# Patient Record
(demographics unavailable — no encounter records)

---

## 2025-01-30 NOTE — PHYSICAL EXAM
[Normal Venous Pressure] : normal venous pressure [Normal S1, S2] : normal S1, S2 [Murmur] : murmur [Clear Lung Fields] : clear lung fields [Soft] : abdomen soft [de-identified] : GUNNAR

## 2025-01-30 NOTE — DISCUSSION/SUMMARY
[FreeTextEntry1] : pt with LAE and some intermittent sob  continue diabetes diet  pt told ibuprofen daily should not be done due to increased risk of MI  pt if severe OA told to get surgery if severe pain or to use Tylenol - AVOID NSAIDs  compensated DD2  PVC BURDEN 17% 2024, continue atenolol to 75 mg daily unable to tolerate metoprolol in past  f/u pmd for Synthroid  use compression stockings for venous insuffiency and f/u varicosities with dr. diamond  diet control for DM cont farxiga 10 mg po qd  continue ranolazine 500 mg po q12  continue aspirin 81 mg po qd  stop Plavix 75 mg daily  stop atorvastatin 80 mg daily reduce 40 mg as statin myopathy  cont metoprolol 25 mg po q12  give sl ntg prn  has carotid dz but not severe will f/u  start Zetia 10 mg po qhs  bloodwork/f/u in 4 months/2d echo  if ldl not to goal will get Repatha next visit. REPATHA NOT COVERED WILL TRY PRALUENT  start zepbound - as per YANIV Rich patient's insurance does not cover any weight loss medications.

## 2025-01-30 NOTE — PHYSICAL EXAM
[Normal Venous Pressure] : normal venous pressure [Normal S1, S2] : normal S1, S2 [Murmur] : murmur [Clear Lung Fields] : clear lung fields [Soft] : abdomen soft [de-identified] : GUNNAR

## 2025-01-30 NOTE — PHYSICAL EXAM
[Normal Venous Pressure] : normal venous pressure [Normal S1, S2] : normal S1, S2 [Murmur] : murmur [Clear Lung Fields] : clear lung fields [Soft] : abdomen soft [de-identified] : GUNNAR

## 2025-01-30 NOTE — HISTORY OF PRESENT ILLNESS
[FreeTextEntry1] : PT with MIBG: LIMA to LAD 24, 24: CATH: 80% mLAD bifurcation complicated lesion calcified, 50% D1, 40% RCA, distal LAD 70%, CTA: : CAC: 1498 pLAD: 65%, CT FFR 0.92 OM1: CT FFR: OM1 pRCA: moderate stenosis RPDA: 0.70 PREDM, DD2 HLD, LVH, KIYA, LICA 50-69%, HTN, MORBID OBESITY, PVCS, VARICOSE VEINS  , SEVERE OA ,  STATIN MYOPATHY.   F/U dr. diamond vascular for varicosities, pt complaining of neuropathy, pt complains of pain all the time but not sure if vascular.  LDL 77 on atorvastatin plus ezetimbe had pain so taking half of ezemtimbe will stop was 103   3/24/23: 23: EF: 57%, E/e': 14, TR kirk: 2/7m/s CO: 4.3 l/min, LAE, mild-mod TR, LVH, mild thickening of aortic valve leaflets  t from 133 A1c: 6.0  pt sob going upstairs and ok flat ground. pt not very active. pt says severe OA is a problem for the patient.   23:  GFR: 101, HDL 56 LDL96 bnp: 72 a1c: not done  BS: 106  pt complains of more frequent palpitations and had palpitations today, pt hd PVCS on ekg and one day took atenolol for a lot of palpitation but otherwise is normal .  pt does not want VEIN STRIPPING, pt having more frequent edema in her legs in the sun.   10/5/23: pt under a lot of stress thinking about /father that  years ago and her friends   23: Telephone call with NP: Patient c/o palpitations during the day. STOP Atenolol START Metoprolol Tartrate 50mg BID.  pt comes in for evaluation of palpitations.  pt was not sleeping on metoprolol and started on atenolol. pt  getting sob at this time and says usually strong independent person. pt not sure if do to thyroid.  pt has bladder issues, LDL normal TSH 2.17. pt urinating a lot at night.   24: 24: EF: 50-55%,? midseptal mild hypokinesis, e' sept: 0.09 m/s E/e': 9.30, LVOT VTI: 26 cm, DD1, mild ABHINAV, mild LAE mild TR, YANELI, TDS  lv filling pressures improved but  ? midseptal hypokinesis, LVH improved  MCOT: PVC BURDEN OF 17% 3/24:  HDL 55, gfr: 99, A1c 6.3, tsh: 4.65  Patient states she is noticing that she has to breath deeper when going up the stairs or with vacuuming at times. pt does not walk far. pt when sob relaxes and resolves in a minute.   24: CTA: : CAC: 1498 pLAD: 65%, CT FFR 0.92 OM1: CT FFR: OM1 pRCA: moderate stenosis RPDA: 0.70  pt sob at times worsening and RWMA walking up for flight of stairs.  r/bs will schedule cath Dr. Yi start ranolazine 500 mg po qd and baby aspirin 81 mg daily.  schedule cath.   5/10/24:  4/10/24: LICA: 50-69%, XENIA: 50%, modearate calcified atherosclerosis b/l ica.  24: CATH: 80% mLAD bifurcation complicated lesion calcified, 50% D1, 40% RCA, distal LAD 70% lvedp: 13 mmHg  d/w dr. yi best method for revasc is bypass. d/w cts [place] dr. mary felix and coronary anatomy is amenable to minimally invasive bypass.  LDL 3/24 was 101 on atorvastatin 40 mg po qhs.  A1c: 6.4 not sure if taking farxiga.  Patient states had episode last night took an aspirin and Xanax and sat up on couch and lasted one hour and went to couch  pt had racing heart and some sob improved with xanax and extra atenolol last night.   24: MIBG: DANIEL to LAD 24   pt doing well healing well and no complaints, pt feels improved and breathe and able to sing. pt was unable to sing as sob btu improved. pt has mild pain.  pt has no sob and feeling well and no complaints. pt came home on oxygen and still using oxygen using it to sleep. pt says pulse ox 94% and 93%, pt says only using oxygen at night.   24:  pt had bronchitis and was sick postop needed prednisone and albuterol. pt no longer on oxygen and feeling well and scars are well healed.  pt just started getting back to feeling good, lungs clear. pt doing ADLs, pt gonna start walking and using foot thing at home  wants to reduce metoprolol to 25 q12  : LDL: 109 HDL 58  GFR: 101 a1c: 6.1, BNP: 82 pt not sure if can work. pt can go to work when wants.  ROSUVASTATIN 40 mg po qhs CAUSED CONSTIPATION.  Patient had palpitations 2 weeks ago and took a dose of Atenolol.   25: 24: TSH: 4.84, A1C: 6.6, HDL: 62, T, LDL: 93 improved but still elevated.  atorvastatin increased to 80 mg and was unable to get out of bed. pt had no problem with 40 mg atorvastatin with less issues and lots of pain.  pt feels wll and walking and going up and downstairs with OA pain, and mild sob.  pt gets a stabbing pain and notices with gas it happens.

## 2025-01-30 NOTE — PHYSICAL EXAM
[Normal Venous Pressure] : normal venous pressure [Normal S1, S2] : normal S1, S2 [Murmur] : murmur [Clear Lung Fields] : clear lung fields [Soft] : abdomen soft [de-identified] : GUNNAR

## 2025-01-30 NOTE — CARDIOLOGY SUMMARY
[de-identified] : 2/24/23: EF: 57%, E/e': 14, TR kirk: 2/7m/s CO: 4.3l/min, LAE, mild-mod TR, LVH, mild thickening of aortic valve leaflets \par  \par  3/22: CO: 5.9 l/min, lvef 50%, SEVERE LAE, LAESVI: 45 ml/m2, LVH \par

## 2025-01-30 NOTE — CARDIOLOGY SUMMARY
[de-identified] : 2/24/23: EF: 57%, E/e': 14, TR kirk: 2/7m/s CO: 4.3l/min, LAE, mild-mod TR, LVH, mild thickening of aortic valve leaflets \par  \par  3/22: CO: 5.9 l/min, lvef 50%, SEVERE LAE, LAESVI: 45 ml/m2, LVH \par

## 2025-01-30 NOTE — CARDIOLOGY SUMMARY
[de-identified] : 2/24/23: EF: 57%, E/e': 14, TR kirk: 2/7m/s CO: 4.3l/min, LAE, mild-mod TR, LVH, mild thickening of aortic valve leaflets \par  \par  3/22: CO: 5.9 l/min, lvef 50%, SEVERE LAE, LAESVI: 45 ml/m2, LVH \par

## 2025-01-30 NOTE — CARDIOLOGY SUMMARY
[de-identified] : 2/24/23: EF: 57%, E/e': 14, TR kirk: 2/7m/s CO: 4.3l/min, LAE, mild-mod TR, LVH, mild thickening of aortic valve leaflets \par  \par  3/22: CO: 5.9 l/min, lvef 50%, SEVERE LAE, LAESVI: 45 ml/m2, LVH \par

## 2025-06-12 NOTE — HISTORY OF PRESENT ILLNESS
[FreeTextEntry1] : PT with MIBG: LIMA to LAD 24, 24: CATH: 80% mLAD bifurcation complicated lesion calcified, 50% D1, 40% RCA, distal LAD 70%, CTA: : CAC: 1498 pLAD: 65%, CT FFR 0.92 OM1: CT FFR: OM1 pRCA: moderate stenosis RPDA: 0.70 PREDM, DD2 HLD, LVH, KIYA, LICA 50-69%, HTN, MORBID OBESITY, PVCS, VARICOSE VEINS  , SEVERE OA ,  STATIN MYOPATHY.   F/U dr. diamond vascular for varicosities, pt complaining of neuropathy, pt complains of pain all the time but not sure if vascular.  LDL 77 on atorvastatin plus ezetimbe had pain so taking half of ezemtimbe will stop was 103   3/24/23: 23: EF: 57%, E/e': 14, TR kirk: 2/7m/s CO: 4.3 l/min, LAE, mild-mod TR, LVH, mild thickening of aortic valve leaflets  t from 133 A1c: 6.0  pt sob going upstairs and ok flat ground. pt not very active. pt says severe OA is a problem for the patient.   23:  GFR: 101, HDL 56 LDL96 bnp: 72 a1c: not done  BS: 106  pt complains of more frequent palpitations and had palpitations today, pt hd PVCS on ekg and one day took atenolol for a lot of palpitation but otherwise is normal .  pt does not want VEIN STRIPPING, pt having more frequent edema in her legs in the sun.   10/5/23: pt under a lot of stress thinking about /father that  years ago and her friends   23: Telephone call with NP: Patient c/o palpitations during the day. STOP Atenolol START Metoprolol Tartrate 50mg BID.  pt comes in for evaluation of palpitations.  pt was not sleeping on metoprolol and started on atenolol. pt  getting sob at this time and says usually strong independent person. pt not sure if do to thyroid.  pt has bladder issues, LDL normal TSH 2.17. pt urinating a lot at night.   24: 24: EF: 50-55%,? midseptal mild hypokinesis, e' sept: 0.09 m/s E/e': 9.30, LVOT VTI: 26 cm, DD1, mild ABHINAV, mild LAE mild TR, YANELI, TDS  lv filling pressures improved but  ? midseptal hypokinesis, LVH improved  MCOT: PVC BURDEN OF 17% 3/24:  HDL 55, gfr: 99, A1c 6.3, tsh: 4.65  Patient states she is noticing that she has to breath deeper when going up the stairs or with vacuuming at times. pt does not walk far. pt when sob relaxes and resolves in a minute.   24: CTA: : CAC: 1498 pLAD: 65%, CT FFR 0.92 OM1: CT FFR: OM1 pRCA: moderate stenosis RPDA: 0.70  pt sob at times worsening and RWMA walking up for flight of stairs.  r/bs will schedule cath Dr. Yi start ranolazine 500 mg po qd and baby aspirin 81 mg daily.  schedule cath.   5/10/24:  4/10/24: LICA: 50-69%, XENIA: 50%, modearate calcified atherosclerosis b/l ica.  24: CATH: 80% mLAD bifurcation complicated lesion calcified, 50% D1, 40% RCA, distal LAD 70% lvedp: 13 mmHg  d/w dr. yi best method for revasc is bypass. d/w cts [place] dr. mary felix and coronary anatomy is amenable to minimally invasive bypass.  LDL 3/24 was 101 on atorvastatin 40 mg po qhs.  A1c: 6.4 not sure if taking farxiga.  Patient states had episode last night took an aspirin and Xanax and sat up on couch and lasted one hour and went to couch  pt had racing heart and some sob improved with xanax and extra atenolol last night.   24: MIBG: DANIEL to LAD 24   pt doing well healing well and no complaints, pt feels improved and breathe and able to sing. pt was unable to sing as sob btu improved. pt has mild pain.  pt has no sob and feeling well and no complaints. pt came home on oxygen and still using oxygen using it to sleep. pt says pulse ox 94% and 93%, pt says only using oxygen at night.   24:  pt had bronchitis and was sick postop needed prednisone and albuterol. pt no longer on oxygen and feeling well and scars are well healed.  pt just started getting back to feeling good, lungs clear. pt doing ADLs, pt gonna start walking and using foot thing at home  wants to reduce metoprolol to 25 q12  : LDL: 109 HDL 58  GFR: 101 a1c: 6.1, BNP: 82 pt not sure if can work. pt can go to work when wants.  ROSUVASTATIN 40 mg po qhs CAUSED CONSTIPATION.  Patient had palpitations 2 weeks ago and took a dose of Atenolol.   25: 24: TSH: 4.84, A1C: 6.6, HDL: 62, T, LDL: 93 improved but still elevated.  atorvastatin increased to 80 mg and was unable to get out of bed. pt had no problem with 40 mg atorvastatin with less issues and lots of pain.  pt feels wll and walking and going up and downstairs with OA pain, and mild sob.  pt gets a stabbing pain and notices with gas it happens.   25:  25: A1C: 6.7, HDL: 53, T, LDL: 73 improved from 93, BNP: 69 3/20/25: TDS, EF: 54%, mild LAE, Trace MR, eccentric lvh, mitral valve prolapse, lvmi: 104 g/m2, e' sept: 0.10 m/s,  25: Carotid: Right: proximal ICA <50% stenosis. Right: ECA >50% stenosis. Left: proximal ICA >70% stenosis, vessel tortuous. Left: ECA >50% stenosis. Vertebral arteries: antegrade flow bilaterally. Subclavian arteries: no significant atherosclerosis bilaterally. Compared to the carotid ultrasound performed on 4/10/2024, LT prox ICA now >70%. Moderate calcified atherosclerosis b/l bifurcation.

## 2025-06-12 NOTE — DISCUSSION/SUMMARY
[Home] : at home, [unfilled] , at the time of the visit. [Medical Office: ([facility])___] : at the medical office located in  [Telephone (audio)] : This telephonic visit was provided via audio only technology. [Verbal consent obtained from patient] : the patient, [unfilled] [Time Spent: ___ minutes] : I have spent [unfilled] minutes with the patient on the telephone [FreeTextEntry1] : pt with LAE and some intermittent sob  continue diabetes diet  pt told ibuprofen daily should not be done due to increased risk of MI  pt if severe OA told to get surgery if severe pain or to use Tylenol - AVOID NSAIDs  compensated DD2  PVC BURDEN 17% 2024, continue atenolol to 75 mg daily unable to tolerate metoprolol in past  f/u pmd for Synthroid  use compression stockings for venous insuffiency and f/u varicosities with dr. diamond  diet control for DM cont farxiga 10 mg po qd  continue ranolazine 500 mg po q12  continue aspirin 81 mg po qd  stop Plavix 75 mg daily  stop atorvastatin 80 mg daily reduce 40 mg as statin myopathy  cont metoprolol 25 mg po q12  give sl ntg prn  has carotid dz but not severe will f/u  start Zetia 10 mg po qhs  bloodwork/f/u in 4 months/2d echo  if ldl not to goal will get Repatha next visit. REPATHA NOT COVERED WILL TRY PRALUENT  start zepbound - as per YANIV Rich patient's insurance does not cover any weight loss medications.

## 2025-06-12 NOTE — CARDIOLOGY SUMMARY
[de-identified] : 2/24/23: EF: 57%, E/e': 14, TR kirk: 2/7m/s CO: 4.3l/min, LAE, mild-mod TR, LVH, mild thickening of aortic valve leaflets \par  \par  3/22: CO: 5.9 l/min, lvef 50%, SEVERE LAE, LAESVI: 45 ml/m2, LVH \par

## 2025-06-12 NOTE — CARDIOLOGY SUMMARY
[de-identified] : 2/24/23: EF: 57%, E/e': 14, TR kirk: 2/7m/s CO: 4.3l/min, LAE, mild-mod TR, LVH, mild thickening of aortic valve leaflets \par  \par  3/22: CO: 5.9 l/min, lvef 50%, SEVERE LAE, LAESVI: 45 ml/m2, LVH \par

## 2025-06-12 NOTE — PHYSICAL EXAM
[Normal Venous Pressure] : normal venous pressure [Normal S1, S2] : normal S1, S2 [Murmur] : murmur [Clear Lung Fields] : clear lung fields [Soft] : abdomen soft [de-identified] : GUNNAR

## 2025-06-12 NOTE — PHYSICAL EXAM
[Normal Venous Pressure] : normal venous pressure [Normal S1, S2] : normal S1, S2 [Murmur] : murmur [Clear Lung Fields] : clear lung fields [Soft] : abdomen soft [de-identified] : GUNNAR

## 2025-06-12 NOTE — DISCUSSION/SUMMARY
[Home] : at home, [unfilled] , at the time of the visit. [Medical Office: ([facility])___] : at the medical office located in  [Telephone (audio)] : This telephonic visit was provided via audio only technology. [Verbal consent obtained from patient] : the patient, [unfilled] [Time Spent: ___ minutes] : I have spent [unfilled] minutes with the patient on the telephone [FreeTextEntry1] : pt with LAE and some intermittent sob  continue diabetes diet  pt told ibuprofen daily should not be done due to increased risk of MI  pt if severe OA told to get surgery if severe pain or to use Tylenol - AVOID NSAIDs  compensated DD2  PVC BURDEN 17% 2024, continue atenolol to 75 mg daily unable to tolerate metoprolol in past  f/u pmd for Synthroid  use compression stockings for venous insuffiency and f/u varicosities with dr. diamond  diet control for DM cont farxiga 10 mg po qd  continue ranolazine 500 mg po q12  continue aspirin 81 mg po qd  stop Plavix 75 mg daily  stop atorvastatin 80 mg daily reduce 40 mg as statin myopathy  cont metoprolol 25 mg po q12  give sl ntg prn  has carotid dz but not severe will f/u  start Zetia 10 mg po qhs  bloodwork/f/u in 4 months/2d echo  if ldl not to goal will get Repatha next visit. REPATHA NOT COVERED WILL TRY PRALUENT  start zepbound - as per YANIV Rich patient's insurance does not cover any weight loss medications.